# Patient Record
(demographics unavailable — no encounter records)

---

## 2025-01-14 NOTE — HISTORY OF PRESENT ILLNESS
[de-identified] : 70-year-old woman returns for interval follow-up of a stiff right shoulder and right groin pain which really now is localized to the base of her buttocks.  When she was last seen in April of last year there was some concern that this was really coming from her back.  Her insurance would not approve an MRI.  She returns today for physical therapy for her hip.  She has been receiving physical therapy for her stiff right shoulder and has made strides towards recovery.  Still having trouble internally rotating to touch her back.  No longer taking any medications.  Has only pain at extremes of motion and does not routinely take medication.

## 2025-01-14 NOTE — IMAGING
[de-identified] : Pleasant middle-age woman sits comfortably my office in no distress.  Physical examination: Right shoulder: Active forward flexion to 120 degrees.  Abduction 120 degrees.  Internal rotation to her lower lumbar spine.  Mildly abnormal impingement sign.  Remains with some weakness in external rotation strength consistent with some mild rotator cuff weakness.  No axilla lymph nodes.  Normal light sensation about her shoulder.  No bony tenderness.  Right hip: Pain with abduction external rotation localized to her inguinal crease.  No tenderness of the trochanteric bursa.  Negative straight leg raise test.  No paraspinal or lumbar tenderness.  No tenderness of the sacroiliac joint.  Negative SHEILA maneuver.

## 2025-01-14 NOTE — ASSESSMENT
[FreeTextEntry1] : 70-year-old woman returns with a frozen right shoulder and probable right labral tear.  Right therapy further shoulder.  Explained to patient that this can take 18 months to resolve.  Therapy for her shoulder will continue to work on range of motion.  Will also work on therapy for her hip.  She does not respond to therapy in her hip in 2 months will see her back and get arrangements for an MRI.  Plan reviewed with patient.  Agrees with plan.

## 2025-03-27 NOTE — HISTORY OF PRESENT ILLNESS
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  She is on Eliquis 5 mg twice a day, foot pain has resolved.

## 2025-03-27 NOTE — DATA REVIEWED
[FreeTextEntry1] : I performed an arterial duplex which was medically necessary to evaluate for patency of the stent. It showed patent left iliac artery with stent, flow disturbance noted in the CFA.

## 2025-03-27 NOTE — ASSESSMENT
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 She is on Eliquis 5 mg twice a day, foot pain has resolved.  Left iliac artery stent was noted to be patent.  Continue Eliquis, follow up in 6 months.   I, Dr. Klaus Gallardo, personally performed the evaluation and management (E/M) services for this established patient who presents today with (a) new problem(s)/exacerbation of (an) existing condition(s). That E/M includes conducting the clinically appropriate interval history &/or exam, assessing all new/exacerbated conditions, and establishing a new plan of care. Today, my IAN, Radha Gilbert PA-C, was here to observe my evaluation and management service for this new problem/exacerbated condition and follow the plan of care established by me going forward.

## 2025-03-27 NOTE — CONSULT LETTER
[Dear  ___] : Dear  [unfilled], [Courtesy Letter:] : I had the pleasure of seeing your patient, [unfilled], in my office today. [Please see my note below.] : Please see my note below. [FreeTextEntry2] : Dear Dr. Scott Adames,

## 2025-04-01 NOTE — REASON FOR VISIT
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

## 2025-04-01 NOTE — ASSESSMENT
[FreeTextEntry1] : 71-year-old woman respond to physical therapy for adhesive capsulitis and right shoulder arthritis.  Also has signs of mild right hip arthritis.  Reviewed proper dosing of NSAIDs and/or Tylenol for pain relief.  Agreed patient would benefit can continue physical therapy which we have renewed.  Will have the patient check in with us in 6 months.  Repeat x-rays of right hip on follow-up.

## 2025-04-01 NOTE — HISTORY OF PRESENT ILLNESS
[de-identified] : 81-year-old woman walks without a cane or assistive device returns for repeat evaluation adhesive capsulitis and probable labral tear right shoulder.  She also returns for follow-up of a presumptive diagnosis of a labral tear of the right hip with early arthritis.  She has been receiving physical therapy for her shoulder which is significantly improved her symptoms.  She can now reach up overhead but still has difficulty with internal rotation particular touching her back.  Is not find over-the-counter NSAIDs or Tylenol particularly helpful.  Finds modalities more helpful.  Denies any new trauma.  Has been able to carry out activities of daily living with greater improvement since physical therapy would like to continue physical therapy now focusing on her hip for which she has not had therapy.

## 2025-04-01 NOTE — IMAGING
[de-identified] : Pleasant middle-age woman walks into my office with no antalgia.  She was able to easily get up onto the exam table and off exam mL without any assistance.  Physical examination: Right shoulder: Active forward flexion 140 degrees.  Abduction to 100 degrees.  With the shoulder held at 9 degrees of abduction external rotations now 80 degrees.  Internal rotation remains around 35 degrees.  With her shoulder at her side internal rotation is to her belt line.  She has difficulty with liftoff secondary to pain.  Equivocal speeds test.  Negative Yergason's Test.  Somewhat abnormal Cottonwood's test.  Negative impingement sign.  Right hip: No Trendelenburg lurch.  No tenderness of the trochanteric bursa.  Really minimal discomfort with external rotation.  Has pain with forced internal rotation particularly at 90 degrees of flexion.  Gait nonantalgic.